# Patient Record
Sex: MALE | Race: WHITE | NOT HISPANIC OR LATINO | Employment: FULL TIME | URBAN - NONMETROPOLITAN AREA
[De-identification: names, ages, dates, MRNs, and addresses within clinical notes are randomized per-mention and may not be internally consistent; named-entity substitution may affect disease eponyms.]

---

## 2023-01-20 ENCOUNTER — OCCUPATIONAL MEDICINE (OUTPATIENT)
Dept: URGENT CARE | Facility: PHYSICIAN GROUP | Age: 63
End: 2023-01-20
Payer: COMMERCIAL

## 2023-01-20 VITALS
WEIGHT: 209 LBS | HEIGHT: 70 IN | RESPIRATION RATE: 14 BRPM | OXYGEN SATURATION: 98 % | BODY MASS INDEX: 29.92 KG/M2 | SYSTOLIC BLOOD PRESSURE: 138 MMHG | TEMPERATURE: 97.8 F | HEART RATE: 69 BPM | DIASTOLIC BLOOD PRESSURE: 80 MMHG

## 2023-01-20 DIAGNOSIS — M79.631 RIGHT FOREARM PAIN: ICD-10-CM

## 2023-01-20 DIAGNOSIS — M77.8 FOREARM TENDONITIS: ICD-10-CM

## 2023-01-20 PROCEDURE — 99203 OFFICE O/P NEW LOW 30 MIN: CPT | Performed by: NURSE PRACTITIONER

## 2023-01-20 RX ORDER — PREDNISONE 20 MG/1
20 TABLET ORAL 2 TIMES DAILY
Qty: 10 TABLET | Refills: 0 | Status: SHIPPED | OUTPATIENT
Start: 2023-01-20 | End: 2023-01-25

## 2023-01-20 ASSESSMENT — ENCOUNTER SYMPTOMS
FOCAL WEAKNESS: 0
FEVER: 0
CHILLS: 0
SENSORY CHANGE: 0
TINGLING: 0

## 2023-01-20 NOTE — LETTER
"EMPLOYEE’S CLAIM FOR COMPENSATION/ REPORT OF INITIAL TREATMENT  FORM C-4    EMPLOYEE’S CLAIM - PROVIDE ALL INFORMATION REQUESTED   First Name  Bebeto Last Name  Al Birthdate                    1960                Sex  male Claim Number (Insurer’s Use Only)    Home Address  [address] Age  62 y.o. Height  1.765 m (5' 9.5\") Weight  94.8 kg (209 lb) 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 (if applicable)  pulling wire stators    How did this injury or occupational disease occur? (Be specific an answer in detail. Use additional sheet if necessary)  Repetition. Constant repetition of motion in R forearms   If you believe that you have an occupational disease, when did you first have knowledge of the disability and it relationship to your employment?  NA Witnesses to the Accident  Coworkers      Nature of Injury or Occupational Disease  Strain  Part(s) of Body Injured or Affected  Lower Arm (R), ,     I certify that the above is true and correct to the best of my knowledge and that I have provided this information in order to obtain the benefits of [place] " [place]).  I hereby authorize any physician, chiropractor, surgeon, practitioner, or other person, any hospital, including [facility] hospital, any medical service organization, any insurance company, or other institution or organization to release to each other, any medical or other information, including benefits paid or payable, pertinent to this injury or disease, except information relative to diagnosis, treatment and/or counseling for AIDS, psychological conditions, alcohol or controlled substances, for which I must give specific authorization.  A Photostat of this authorization shall be as valid as the original.     Date 01/20/2023   [facility] Employee’s Original or  *Electronic Signature   THIS REPORT MUST BE COMPLETED AND MAILED WITHIN 3 WORKING DAYS OF TREATMENT   [facility] [place] [facility]  Name of Facility  [facility]   Date  1/20/2023 Diagnosis and Description of Injury or Occupational Disease  (M77.8) Forearm tendonitis  (M79.631) Right forearm pain Is there evidence the injured employee was under the influence of alcohol and/or another controlled substance at the time of accident?  ? No ? Yes (if yes, please explain)    Hour  11:05 AM   Diagnoses of Forearm tendonitis and Right forearm pain were pertinent to this visit. No   Treatment  Prednisone 20 mg BID x 5 days.  Continue diclofenac gel as previously prescribed.  Have you advised the patient to remain off work five days or     more?    X-Ray Findings      ? Yes Indicate dates:   From   To      From information given by the employee, together with medical evidence, can        you directly connect this injury or occupational disease as job incurred?  Yes  Comments:Likely due to repeat strain ? No If no, is the injured employee capable of:  ? full duty  Yes ? modified duty      Is additional medical care by a physician  "indicated?  Yes  Comments:Follow up in 1 month, sooner if worse If Modified Duty, Specify any Limitations / Restrictions      Do you know of any previous injury or disease contributing to this condition or occupational disease?  ? Yes ? No (Explain if yes)                          No   Date  1/20/2023 Print Health Care Provider's   PATRICE Mays I certify the employer’s copy of  this form was mailed on:   Address  [address] [place] [address] Insurer’s Use Only     [facility] Zip  [address]    Provider’s Tax ID Number  [id number] Telephone  Dept: [phone]             Health Care Provider’s Original or Electronic Signature  e-EMANI Yee Degree (MD,DO, DC,PAMaliaC,APRN)   APRN      * Complete and attach Release of Information (Form C-4A) when injured employee signs C-4 Form electronically  ORIGINAL - TREATING HEALTHCARE PROVIDER PAGE 2 - INSURER/TPA PAGE 3 - EMPLOYER PAGE 4 - EMPLOYEE             Form C-4 (rev.08/21)           BRIEF DESCRIPTION OF RIGHTS AND BENEFITS  (Pursuant to [place] 616C.050)    Notice of Injury or Occupational Disease (Incident Report Form C-1): If an injury or occupational disease (OD) arises out of and in the course of employment, you must provide written notice to your employer as soon as practicable, but no later than 7 days after the accident or OD. Your employer shall maintain a sufficient supply of the required forms.    Claim for Compensation (Form C-4): If medical treatment is sought, the form C-4 is available at the place of initial treatment. A completed \"Claim for Compensation\" (Form C-4) must be filed within 90 days after an accident or OD. The treating physician or chiropractor must, within 3 working days after treatment, complete and mail to the employer, the employer's insurer and third-party , the Claim for Compensation.    Medical Treatment: If you require medical treatment for your on-the-job injury or OD, you may be required to select " a physician or chiropractor from a list provided by your workers’ compensation insurer, if it has contracted with an Organization for Managed Care (MCO) or Preferred Provider Organization (PPO) or providers of health care. If your employer has not entered into a contract with an MCO or PPO, you may select a physician or chiropractor from the Panel of Physicians and Chiropractors. Any medical costs related to your industrial injury or OD will be paid by your insurer.    Temporary Total Disability (TTD): If your doctor has certified that you are unable to work for a period of at least 5 consecutive days, or 5 cumulative days in a 20-day period, or places restrictions on you that your employer does not accommodate, you may be entitled to TTD compensation.    Temporary Partial Disability (TPD): If the wage you receive upon reemployment is less than the compensation for TTD to which you are entitled, the insurer may be required to pay you TPD compensation to make up the difference. TPD can only be paid for a maximum of 24 months.    Permanent Partial Disability (PPD): When your medical condition is stable and there is an indication of a PPD as a result of your injury or OD, within 30 days, your insurer must arrange for an evaluation by a rating physician or chiropractor to determine the degree of your PPD. The amount of your PPD award depends on the date of injury, the results of the PPD evaluation, your age and wage.    Permanent Total Disability (PTD): If you are medically certified by a treating physician or chiropractor as permanently and totally disabled and have been granted a PTD status by your insurer, you are entitled to receive monthly benefits not to exceed 66 2/3% of your average monthly wage. The amount of your PTD payments is subject to reduction if you previously received a lump-sum PPD award.    Vocational Rehabilitation Services: You may be eligible for vocational rehabilitation services if you are  unable to return to the job due to a permanent physical impairment or permanent restrictions as a result of your injury or occupational disease.    Transportation and Per Jacoby Reimbursement: You may be eligible for travel expenses and per jacoby associated with medical treatment.    Reopening: You may be able to reopen your claim if your condition worsens after claim closure.     Appeal Process: If you disagree with a written determination issued by the insurer or the insurer does not respond to your request, you may appeal to the Department of Administration, , by following the instructions contained in your determination letter. You must appeal the determination within 70 days from the date of the determination letter at [address], [address], [place], [place] [address], or [address], [address], [place], [place] [address]. If you disagree with the  decision, you may appeal to the Department of Administration, . You must file your appeal within 30 days from the date of the  decision letter at [address], [address], [place], [place] [address], or [address] [facility] [address], [place] [address]. If you disagree with a decision of an , you may file a petition for judicial review with the District Court. You must do so within 30 days of the Appeal Officer’s decision. You may be represented by an  at your own expense or you may contact the [facility] for possible representation.    [place]  for Injured Workers (NAIW): If you disagree with a  decision, you may request that NAIW represent you without charge at an  Hearing. For information regarding denial of benefits, you may contact the [facility] at: [address], [address], [place] [address], [phone], or [address] [facility] [address], [place] [address], [phone]    To File a Complaint with the  Division: If you wish to file a complaint with the  of the Division of Industrial Relations (DIR),  please contact the Workers’ Compensation Section, [address] [facility] [place] [address], [place], [place] [address], telephone [phone], or [address], [address], [place], [place] [address], telephone [phone].    For assistance with Workers’ Compensation Issues: You may contact the [facility] Office for Consumer Health Assistance, [address], [address], [place], [place] [address], Toll Free 1-439.960.8791, Web site: http://[facility].[place].gov/Programs/PARTH E-mail: parth@[facility].nv.gov              __________________________________________________________________                                    __01/20/23___________            Employee Name / Signature                                                                                                                            Date                                                                                                                                                                                                                              D-2 (rev. 10/20)

## 2023-01-20 NOTE — PROGRESS NOTES
"Subjective     Bebeto Melendez is a 62 y.o. male who presents with Work-Related Injury (W/C new  right forearm - strain - ethel doi 04 08 2022)      Bebeto comes in today for evaluation regarding recurrent right forearm pain.  He first noted the pain at work on April 8, 2022.  He did not sustain any overt injury, but performs repeated grasping and pulling motions with lifting, pushing and pulling.  He was evaluated at on-site [facility] care and was advised of conservative measures to manage the symptoms.  He noted that the pain would flare during the work week and resolve on his days off or with rest.  Since then this pattern has persisted.  He continued to see [employer] with no resolution of symptoms.  He noted worsening of symptoms in November and increased his visits to [employer].  He also consulted his PCP and was prescribed diclofenac gel.  He sees a  and has massage.  He notes that the diclofenac, rest and massage all provide short term relief.  He denies any numbness, tingling or weakness. He is left hand dominant but uses his right hand in conjunction with the left to perform his usual work duties.  He feels capable of full duty work at this time.       HPI    Review of Systems   Constitutional:  Negative for chills, fever and malaise/fatigue.   Neurological:  Negative for tingling, sensory change and focal weakness.      Medications, Allergies, and current problem list reviewed today in Epic      Objective     Blood Pressure 138/80   Pulse 69   Temperature 36.6 °C (97.8 °F) (Temporal)   Respiration 14   Height 1.765 m (5' 9.5\")   Weight 94.8 kg (209 lb)   Oxygen Saturation 98%   Body Mass Index 30.42 kg/m²      Physical Exam    Bebeto is alert, oriented, and in no acute distress.  Vitals stable. Afebrile.  Right forearm and hand are warm, dry, and intact with no bruising, swelling, erythema, rash or lesion.  Elbow and wrist ROM intact.  Focal TTP of the forearm at the proximal radial " area at elbow attachment with slight muscle spasm.  Distal NV, sensation and strength intact.   intact but increases discomfort at proximal forearm.  Cap refill < 2 seconds.                   Assessment & Plan        1. Forearm tendonitis    - predniSONE (DELTASONE) 20 MG Tab; Take 1 Tablet by mouth 2 times a day for 5 days.  Dispense: 10 Tablet; Refill: 0    2. Right forearm pain    Discussed exam findings with Bebeto.  Differential reviewed.  Prednisone as prescribed.  Risks and benefits reviewed.  Continue diclofenac gel prn as previously prescribed.  No work restrictions.  He feels capable and comfortable with full duty work.   Likely work related strain.  Uncertain if claim will be approved due to length of time since onset of symptoms.    Follow up in 1 month, sooner if worse.  He verbalized understanding of and agreed with plan of care.

## 2023-01-20 NOTE — LETTER
[facility]  [address]  [place] [address]  Phone:  [phone] - Fax:  [phone]   [facility] Progress Report and Disability Certification  Date of Service: 1/20/2023   No Show:  No  Date / Time of Next Visit: 2/17/2023   Claim Information   Patient Name: Bebeto Melendez  Claim Number:     Employer: [employer]  Date of Injury: 11/7/2022     Insurer / TPA:   Mayela ID / SSN:     Occupation: Drive Unit  Diagnosis: Diagnoses of Forearm tendonitis and Right forearm pain were pertinent to this visit.    Medical Information   Related to Industrial Injury? Yes  Comments:Likely repeat strain.    Subjective Complaints:  Bebeto comes in today for evaluation regarding recurrent right forearm pain.  He first noted the pain at work on April 8, 2022.  He did not sustain any overt injury, but performs repeated grasping and pulling motions with lifting, pushing and pulling.  He was evaluated at on-site [employer] care and was advised of conservative measures to manage the symptoms.  He noted that the pain would flare during the work week and resolve on his days off or with rest.  Since then this pattern has persisted.  He continued to see [employer] with no resolution of symptoms.  He noted worsening of symptoms in November and increased his visits to [employer].  He also consulted his PCP and was prescribed diclofenac gel.  He sees a  and has massage.  He notes that the diclofenac, rest and massage all provide short term relief.  He denies any numbness, tingling or weakness. He is left hand dominant but uses his right hand in conjunction with the left to perform his usual work duties.  He feels capable of full duty work at this time.     Objective Findings: Bebeto is alert, oriented, and in no acute distress.  Vitals stable. Afebrile.  Right forearm and hand are warm, dry, and intact with no bruising, swelling, erythema, rash or lesion.  Elbow and wrist ROM  intact.  Focal TTP of the forearm at the proximal radial area at elbow attachment with slight muscle spasm.  Distal NV, sensation and strength intact.   intact but increases discomfort at proximal forearm.  Cap refill < 2 seconds.   Pre-Existing Condition(s):     Assessment:   Initial Visit    Status: Additional Care Required  Comments:Follow up in 1 month, sooner if worse.  Permanent Disability:No    Plan: Medication  Comments:Prednisone 20 mg BID x 5 days.  diclofenac gel as previously prescribed.    Diagnostics:      Comments:       Disability Information   Status: Released to Full Duty    From:  1/20/2023  Through: 2/17/2023 Restrictions are:     Physical Restrictions   Sitting:    Standing:    Stooping:    Bending:      Squatting:    Walking:    Climbing:    Pushing:      Pulling:    Other:    Reaching Above Shoulder (L):   Reaching Above Shoulder (R):       Reaching Below Shoulder (L):    Reaching Below Shoulder (R):      Not to exceed Weight Limits   Carrying(hrs):   Weight Limit(lb):   Lifting(hrs):   Weight  Limit(lb):     Comments:      Repetitive Actions   Hands: i.e. Fine Manipulations from Grasping:     Feet: i.e. Operating Foot Controls:     Driving / Operate Machinery:     Health Care Provider’s Original or Electronic Signature  PATRICE Mays Health Care Provider’s Original or Electronic Signature    Michael Rao DO MPH     Clinic Name / Location: [address] [address] Clinic Phone Number: Dept: [phone]   Appointment Time: 10:45 Am Visit Start Time: 11:05 AM   Check-In Time:  10:20 Am Visit Discharge Time:  11:46 AM   Original-Treating Physician or Chiropractor    Page 2-Insurer/TPA    Page 3-Employer    Page 4-Employee

## 2023-02-09 ENCOUNTER — OCCUPATIONAL MEDICINE (OUTPATIENT)
Dept: URGENT CARE | Facility: PHYSICIAN GROUP | Age: 63
End: 2023-02-09
Payer: COMMERCIAL

## 2023-02-09 VITALS
TEMPERATURE: 97.6 F | WEIGHT: 214 LBS | BODY MASS INDEX: 31.7 KG/M2 | OXYGEN SATURATION: 97 % | DIASTOLIC BLOOD PRESSURE: 80 MMHG | HEIGHT: 69 IN | HEART RATE: 61 BPM | RESPIRATION RATE: 14 BRPM | SYSTOLIC BLOOD PRESSURE: 138 MMHG

## 2023-02-09 DIAGNOSIS — M77.8 FOREARM TENDONITIS: ICD-10-CM

## 2023-02-09 DIAGNOSIS — M79.631 RIGHT FOREARM PAIN: ICD-10-CM

## 2023-02-09 PROCEDURE — 99214 OFFICE O/P EST MOD 30 MIN: CPT | Performed by: NURSE PRACTITIONER

## 2023-02-09 RX ORDER — MULTIVIT-MIN/IRON/FOLIC ACID/K 18-600-40
CAPSULE ORAL
COMMUNITY

## 2023-02-09 RX ORDER — SPIRONOLACTONE 25 MG/1
25 TABLET ORAL DAILY
COMMUNITY

## 2023-02-09 RX ORDER — SIMVASTATIN 10 MG
10 TABLET ORAL NIGHTLY
COMMUNITY

## 2023-02-09 RX ORDER — HYDROCHLOROTHIAZIDE 25 MG/1
25 TABLET ORAL DAILY
COMMUNITY

## 2023-02-09 RX ORDER — PREDNISONE 10 MG/1
20 TABLET ORAL 2 TIMES DAILY
Qty: 20 TABLET | Refills: 0 | Status: SHIPPED | OUTPATIENT
Start: 2023-02-09 | End: 2023-02-14

## 2023-02-09 RX ORDER — LOSARTAN POTASSIUM 50 MG/1
50 TABLET ORAL DAILY
COMMUNITY

## 2023-02-09 NOTE — LETTER
[facility]  [address]  [place] [address]  Phone:  [phone] - Fax:  [phone]   [facility] Progress Report and Disability Certification  Date of Service: 2/9/2023   No Show:  No  Date / Time of Next Visit: 2/23/2023   Claim Information   Patient Name: Bebeto Melendez  Claim Number:     Employer: [employer]  Date of Injury: 11/7/2022     Insurer / TPA: [employer]  ID / SSN:     Occupation: Drive Unit  Diagnosis: Diagnoses of Forearm tendonitis and Right forearm pain were pertinent to this visit.    Medical Information   Related to Industrial Injury?   Comments:Indeterminant    Subjective Complaints:  DOI: 4/8/2022   Bebeto comes in today for evaluation regarding reoccurring right lateral forearm pain.  He first noticed the pain work on April 8, 2022 and he continues to have intermittent pain in the same location off and on since.  He did not sustain any injury but indicates that repetitive grasping and pulling motion of wires and lifting 50 pounds.  He did contact work right care however was advised that they could not help him at this time due to open Worker's Comp. claim.  He notes that pain flares during the workweek and resolves with rest on his day off.  He does see a  and has massage several times a week which does help with his intermittent flares however missed last week.  He notes that diclofenac gel, rest, sauce, and strengthening exercises does provide relief.  He reports that prednisone has helped in the past.  He denies any numbness, tingling, or weakness in his right hand or arm.  He is left-hand dominant but does use his right hand in conjunction with his left while performing his usual work duties.  He does feel capable that he is able to perform all job tasks at this time and is able to work full duty.    Bebeto comes in today for evaluation regarding recurrent right forearm pain.  He first noted the pain at work on  April 8, 2022.  He did not sustain any overt injury, but performs repeated grasping and pulling motions with lifting, pushing and pulling.  He was evaluated at on-site [facility] care and was advised of conservative measures to manage the symptoms.  He noted that the pain would flare during the work week and resolve on his days off or with rest.  Since then this pattern has persisted.  He continued to see [employer] with no resolution of symptoms.  He noted worsening of symptoms in November and increased his visits to [employer].  He also consulted his PCP and was prescribed diclofenac gel.  He sees a  and has massage.  He notes that the diclofenac, rest and massage all provide short term relief.  He denies any numbness, tingling or weakness. He is left hand dominant but uses his right hand in conjunction with the left to perform his usual work duties.  He feels capable of full duty work at this time.    Objective Findings: He is alert and oriented and in no acute distress.  Vital signs within normal limits.  Right forearm and hand are warm, dry, and intact with no bruising, swelling, erythema, rashes, or lesions.  Elbow and wrist have normal ROM.  Normal sensation noted.  Focal TTP with deep palpation of lateral right forearm.   strength and tact and equal bilaterally.   Pre-Existing Condition(s):     Assessment:   Condition Worsened    Status: Additional Care Required  Permanent Disability:No    Plan: Medication  Comments:Prednisone 20 mg twice daily x5 days.  Diclofenac gel as previously prescribed.    Diagnostics:      Comments:       Disability Information   Status: Released to Full Duty    From:  2/9/2023  Through: 2/23/2023 Restrictions are:     Physical Restrictions   Sitting:    Standing:    Stooping:    Bending:      Squatting:    Walking:    Climbing:    Pushing:      Pulling:    Other:    Reaching Above Shoulder (L):   Reaching Above Shoulder (R):       Reaching Below Shoulder (L):     Reaching Below Shoulder (R):      Not to exceed Weight Limits   Carrying(hrs):   Weight Limit(lb):   Lifting(hrs):   Weight  Limit(lb):     Comments:      Repetitive Actions   Hands: i.e. Fine Manipulations from Grasping:     Feet: i.e. Operating Foot Controls:     Driving / Operate Machinery:     Health Care Provider’s Original or Electronic Signature  PATRICE Pereira Health Care Provider’s Original or Electronic Signature    Michael Rao DO MPH     Clinic Name / Location: [address] [address] Clinic Phone Number: Dept: [phone]   Appointment Time: 3:45 Pm Visit Start Time: 5:45 PM   Check-In Time:  3:29 Pm Visit Discharge Time:  6:45pm   Original-Treating Physician or Chiropractor    Page 2-Insurer/TPA    Page 3-Employer    Page 4-Employee

## 2023-02-09 NOTE — LETTER
"EMPLOYEE’S CLAIM FOR COMPENSATION/ REPORT OF INITIAL TREATMENT  FORM C-4    EMPLOYEE’S CLAIM - PROVIDE ALL INFORMATION REQUESTED   First Name  Bebeto Last Name  Al Birthdate                    1960                Sex  male Claim Number (Insurer’s Use Only)   Home Address  [address] Age  62 y.o. 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 (if applicable)  pulling wire stators    How did this injury or occupational disease occur? (Be specific an answer in detail. Use additional sheet if necessary)  Repetition. Constant repetition of motion in R forearms   If you believe that you have an occupational disease, when did you first have knowledge of the disability and it relationship to your employment?  NA Witnesses to the Accident  [employer]ow[employer]      Nature of Injury or Occupational Disease  Strain  Part(s) of Body Injured or Affected  Lower Arm (R), ,     I certify that the above is true and correct to the best of my knowledge and that I have provided this information in order to obtain the benefits of [place]’s " [place]).  I hereby authorize any physician, chiropractor, surgeon, practitioner, or other person, any hospital, including [facility] or [facility], any medical service organization, any insurance company, or other institution or organization to release to each other, any medical or other information, including benefits paid or payable, pertinent to this injury or disease, except information relative to diagnosis, treatment and/or counseling for AIDS, psychological conditions, alcohol or controlled substances, for which I must give specific authorization.  A Photostat of this authorization shall be as valid as the original.     Date   Place Employee’s Original or  *Electronic Signature   THIS REPORT MUST BE COMPLETED AND MAILED WITHIN 3 WORKING DAYS OF TREATMENT   Place  [facility]  Name of Facility  [place]   Date  2/9/2023 Diagnosis and Description of Injury or Occupational Disease  (M77.8) Forearm tendonitis  (M79.631) Right forearm pain Is there evidence the injured employee was under the influence of alcohol and/or another controlled substance at the time of accident?  ? No ? Yes (if yes, please explain)   Hour  5:45 PM   Diagnoses of Forearm tendonitis and Right forearm pain were pertinent to this visit.     Treatment     Have you advised the patient to remain off work five days or     more?    X-Ray Findings      ? Yes Indicate dates:   From   To      From information given by the employee, together with medical evidence, can        you directly connect this injury or occupational disease as job incurred?    ? No If no, is the injured employee capable of:  ? full duty    ? modified duty      Is additional medical care by a physician indicated?    If Modified Duty, Specify any Limitations / Restrictions      Do you know of any previous injury or disease contributing to this  "condition or occupational disease?  ? Yes ? No (Explain if yes)                              Date  2/9/2023 Print Health Care Provider's   PATRICE Pereira I certify the employer’s copy of  this form was mailed on:   Address  [address] Insurer’s Use Only     [facility] Zip  [address]    Provider’s Tax ID Number  [id number] Telephone  Dept: [phone]             Health Care Provider’s Original or Electronic Signature  e-SignSGARDENIA GOMEZ Degree (MD,DO, DC,PAMaliaC,APRN)  APRN      * Complete and attach Release of Information (Form C-4A) when injured employee signs C-4 Form electronically  ORIGINAL - TREATING HEALTHCARE PROVIDER PAGE 2 - INSURER/TPA PAGE 3 - EMPLOYER PAGE 4 - EMPLOYEE             Form C-4 (rev.08/21)           BRIEF DESCRIPTION OF RIGHTS AND BENEFITS  (Pursuant to [place]RS 616C.050)    Notice of Injury or Occupational Disease (Incident Report Form C-1): If an injury or occupational disease (OD) arises out of and in the course of employment, you must provide written notice to your employer as soon as practicable, but no later than 7 days after the accident or OD. Your employer shall maintain a sufficient supply of the required forms.    Claim for Compensation (Form C-4): If medical treatment is sought, the form C-4 is available at the place of initial treatment. A completed \"Claim for Compensation\" (Form C-4) must be filed within 90 days after an accident or OD. The treating physician or chiropractor must, within 3 working days after treatment, complete and mail to the employer, the employer's insurer and third-party , the Claim for Compensation.    Medical Treatment: If you require medical treatment for your on-the-job injury or OD, you may be required to select a physician or chiropractor from a list provided by your workers’ compensation insurer, if it has contracted with an Organization for Managed Care (MCO) or Preferred Provider Organization " (PPO) or providers of health care. If your employer has not entered into a contract with an MCO or PPO, you may select a physician or chiropractor from the Panel of Physicians and Chiropractors. Any medical costs related to your industrial injury or OD will be paid by your insurer.    Temporary Total Disability (TTD): If your doctor has certified that you are unable to work for a period of at least 5 consecutive days, or 5 cumulative days in a 20-day period, or places restrictions on you that your employer does not accommodate, you may be entitled to TTD compensation.    Temporary Partial Disability (TPD): If the wage you receive upon reemployment is less than the compensation for TTD to which you are entitled, the insurer may be required to pay you TPD compensation to make up the difference. TPD can only be paid for a maximum of 24 months.    Permanent Partial Disability (PPD): When your medical condition is stable and there is an indication of a PPD as a result of your injury or OD, within 30 days, your insurer must arrange for an evaluation by a rating physician or chiropractor to determine the degree of your PPD. The amount of your PPD award depends on the date of injury, the results of the PPD evaluation, your age and wage.    Permanent Total Disability (PTD): If you are medically certified by a treating physician or chiropractor as permanently and totally disabled and have been granted a PTD status by your insurer, you are entitled to receive monthly benefits not to exceed 66 2/3% of your average monthly wage. The amount of your PTD payments is subject to reduction if you previously received a lump-sum PPD award.    Vocational Rehabilitation Services: You may be eligible for vocational rehabilitation services if you are unable to return to the job due to a permanent physical impairment or permanent restrictions as a result of your injury or occupational disease.    Transportation and Per Negrita Reimbursement:  You may be eligible for travel expenses and per jacoby associated with medical treatment.    Reopening: You may be able to reopen your claim if your condition worsens after claim closure.     Appeal Process: If you disagree with a written determination issued by the insurer or the insurer does not respond to your request, you may appeal to the Department of Administration, , by following the instructions contained in your determination letter. You must appeal the determination within 70 days from the date of the determination letter at [address], [address], [place], [place] [address], or [address], [place], [place] [address]. If you disagree with the  decision, you may appeal to the Department of Administration, . You must file your appeal within 30 days from the date of the  decision letter at [address], [address], [place], [place] [address], or [address], [place], [place] [address]. If you disagree with a decision of an , you may file a petition for judicial review with the District Court. You must do so within 30 days of the Appeal Officer’s decision. You may be represented by an  at your own expense or you may contact the [facility] for possible representation.    [place]  for Injured Workers (NAIW): If you disagree with a  decision, you may request that NAIW represent you without charge at an  Hearing. For information regarding denial of benefits, you may contact the [facility] at: [address], [address], [place] [address], [phone], or [address], [place] [address], [phone]    To File a Complaint with the Division: If you wish to file a complaint with the  of the Division of Industrial Relations (DIR),  please contact the Workers’ Compensation Section, [address], [place], [place] [address], telephone (513) [phone], or [address], [place], [place] [address], telephone [phone].    For assistance with Workers’ Compensation Issues: You may contact the [facility] Office for Consumer Health Assistance, [address], [address], [place], [place] [address], Toll Free 1-365.370.2779, Web site: http://[facility].nv.gov/Programs/PARTH E-mail: parth@[facility].nv.gov              __________________________________________________________________                                    _________________            Employee Name / Signature                                                                                                                            Date                                                                                                                                                                                                                              D-2 (rev. 10/20)

## 2023-02-10 NOTE — PROGRESS NOTES
Subjective:     Bebeto Melendez is a 62 y.o. male who presents for Follow-Up (WC follow up, repetitive motion on R arm, was getting better but seems to have irritated it again and is having pain again. )      DOI: 4/8/2022   Bebeto comes in today for evaluation regarding reoccurring right lateral forearm pain.  He first noticed the pain work on April 8, 2022 and he continues to have intermittent pain in the same location off and on since.  He did not sustain any injury but indicates that repetitive grasping and pulling motion of wires and lifting 50 pounds.  He did contact work right care however was advised that they could not help him at this time due to open Worker's Comp. claim.  He notes that pain flares during the workweek and resolves with rest on his day off.  He does see a  and has massage several times a week which does help with his intermittent flares however missed last week.  He notes that diclofenac gel, rest, sauce, and strengthening exercises does provide relief.  He reports that prednisone has helped in the past.  He denies any numbness, tingling, or weakness in his right hand or arm.  He is left-hand dominant but does use his right hand in conjunction with his left while performing his usual work duties.  He does feel capable that he is able to perform all job tasks at this time and is able to work full duty.    Bebeto comes in today for evaluation regarding recurrent right forearm pain.  He first noted the pain at work on April 8, 2022.  He did not sustain any overt injury, but performs repeated grasping and pulling motions with lifting, pushing and pulling.  He was evaluated at on-site [facility] care and was advised of conservative measures to manage the symptoms.  He noted that the pain would flare during the work week and resolve on his days off or with rest.  Since then this pattern has persisted.  He continued to see [employer] with no resolution of symptoms.  He noted worsening  "of symptoms in November and increased his visits to [employer].  He also consulted his PCP and was prescribed diclofenac gel.  He sees a  and has massage.  He notes that the diclofenac, rest and massage all provide short term relief.  He denies any numbness, tingling or weakness. He is left hand dominant but uses his right hand in conjunction with the left to perform his usual work duties.  He feels capable of full duty work at this time.     PMH:   No pertinent past medical history to this problem  MEDS:  Medications were reviewed in EMR  ALLERGIES:  Allergies were reviewed in EMR  SOCHX:  Works at [employer]  FH:   No pertinent family history to this problem       Objective:     /80   Pulse 61   Temp 36.4 °C (97.6 °F) (Temporal)   Resp 14   Ht 1.753 m (5' 9\")   Wt 97.1 kg (214 lb)   SpO2 97%   BMI 31.60 kg/m²     He is alert and oriented and in no acute distress.  Vital signs within normal limits.  Right forearm and hand are warm, dry, and intact with no bruising, swelling, erythema, rashes, or lesions.  Elbow and wrist have normal ROM.  Normal sensation noted.  Focal TTP with deep palpation of lateral right forearm.   strength and tact and equal bilaterally.    Assessment/Plan:       1. Forearm tendonitis  - predniSONE (DELTASONE) 10 MG Tab; Take 2 Tablets by mouth 2 times a day for 5 days.  Dispense: 20 Tablet; Refill: 0    2. Right forearm pain    Other orders  - simvastatin (ZOCOR) 10 MG Tab; Take 10 mg by mouth every evening.  - hydroCHLOROthiazide (HYDRODIURIL) 25 MG Tab; Take 25 mg by mouth every day.  - spironolactone (ALDACTONE) 25 MG Tab; Take 25 mg by mouth every day.  - Cholecalciferol (VITAMIN D) 50 MCG (2000 UT) Cap; Take  by mouth.  - losartan (COZAAR) 50 MG Tab; Take 50 mg by mouth every day.    Released to Full Duty FROM 2/9/2023 TO 2/23/2023          Differential diagnosis, natural history, supportive care, and indications for immediate follow-up discussed.    "

## 2023-02-23 ENCOUNTER — OCCUPATIONAL MEDICINE (OUTPATIENT)
Dept: URGENT CARE | Facility: PHYSICIAN GROUP | Age: 63
End: 2023-02-23
Payer: COMMERCIAL

## 2023-02-23 VITALS
TEMPERATURE: 97.4 F | WEIGHT: 219 LBS | SYSTOLIC BLOOD PRESSURE: 122 MMHG | HEIGHT: 69 IN | BODY MASS INDEX: 32.44 KG/M2 | DIASTOLIC BLOOD PRESSURE: 78 MMHG | RESPIRATION RATE: 14 BRPM | HEART RATE: 71 BPM | OXYGEN SATURATION: 95 %

## 2023-02-23 DIAGNOSIS — M77.8 FOREARM TENDONITIS: ICD-10-CM

## 2023-02-23 DIAGNOSIS — M79.631 RIGHT FOREARM PAIN: ICD-10-CM

## 2023-02-23 PROCEDURE — 99214 OFFICE O/P EST MOD 30 MIN: CPT | Performed by: NURSE PRACTITIONER

## 2023-02-23 NOTE — LETTER
[facility]  [address]  [place] [address]  Phone:  [phone] - Fax:  [phone]   [facility] Progress Report and Disability Certification  Date of Service: 2/23/2023   No Show:  No  Date / Time of Next Visit: 3/17/2023   Claim Information   Patient Name: Bebeto Melendez  Claim Number:     Employer: [employer]  Date of Injury: 11/7/2022     Insurer / TPA: [employer]  ID / SSN:     Occupation: Drive Unit  Diagnosis: Diagnoses of Forearm tendonitis and Right forearm pain were pertinent to this visit.    Medical Information   Related to Industrial Injury? Yes    Subjective Complaints:  DOI: 4/8/2022  HARLEY: Repetition pulling of wires. Constant repetition of motion in R forearms.   2/23/2023: Bebeto comes into the clinic to for reevaluation of right forearm pain. He was last seen on 02/09/2023 and was placed on prednisone and diclofenac gel. He is also using OTC tylenol and continues to go to the gym and preform stretching exercises He state today that is pain in his right forearm dull achy pain on the lateral side of his arm near his elbow 4/10. 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 His pain is unchanged. Denies numbness, tingling, weakness, or decreased ROM.   Objective Findings: He is alert and oriented and in no acute distress.  Vital signs within normal limits.  Right forearm and hand are warm, dry, and intact with no bruising, swelling, erythema, rashes, or lesions.  Elbow and wrist have normal ROM.  Normal sensation noted.  Focal TTP with deep palpation of lateral right forearm.   strength and tact and equal bilaterally.   Pre-Existing Condition(s):     Assessment:   Condition Same    Status: Discharged / Care Transfer  Permanent Disability:No    Plan: MedicationTransfer Care  Comments:Developed neck gel, Tylenol, and  cryotherapy    Diagnostics:      Comments:       Disability Information   Status: Released to Restricted Duty    From:  2/23/2023  Through: 3/17/2023 Restrictions are: Temporary   Physical Restrictions   Sitting:    Standing:    Stooping:    Bending:      Squatting:    Walking:    Climbing:    Pushing:      Pulling:    Other:    Reaching Above Shoulder (L):   Reaching Above Shoulder (R):       Reaching Below Shoulder (L):    Reaching Below Shoulder (R):      Not to exceed Weight Limits   Carrying(hrs):   Weight Limit(lb):   Lifting(hrs):   Weight  Limit(lb):     Comments: Patient has been seen 3 times for Workmen's Comp. injury without any resolution of symptoms.  No pushing, pulling, lifting, or lateral movement of right arm.  Patient will continue with Diflucan neck gel, Tylenol 1000 mg up to 3 times daily, and cryotherapy to help with pain and inflammation.  Continue with gentle stretching exercises  Referral placed today for further evaluation and treatment by occupational therapy.    Repetitive Actions   Hands: i.e. Fine Manipulations from Grasping:     Feet: i.e. Operating Foot Controls:     Driving / Operate Machinery:     Health Care Provider’s Original or Electronic Signature  ESAU Pereira. Health Care Provider’s Original or Electronic Signature    Michael Rao DO MPH     Clinic Name / Location: [address] [address] Clinic Phone Number: Dept: [phone]   Appointment Time: 10:00 Am Visit Start Time: 10:27 AM   Check-In Time:  10:03 Am Visit Discharge Time:  11:02 am    Original-Treating Physician or Chiropractor    Page 2-Insurer/TPA    Page 3-Employer    Page 4-Employee

## 2023-02-23 NOTE — LETTER
[facility]  [address]  [place] [address]  Phone:  [phone] - Fax:  [phone]   [facility] Progress Report and Disability Certification  Date of Service: 2/23/2023   No Show:  No  Date / Time of Next Visit: 3/17/2023   Claim Information   Patient Name: Bebeto Melendez  Claim Number:     Employer: [employer] *** Date of Injury: 11/7/2022     Insurer / TPA: [employer] *** ID / SSN:     Occupation: Drive Unit *** Diagnosis: Diagnoses of Forearm tendonitis and Right forearm pain were pertinent to this visit.    Medical Information   Related to Industrial Injury? Yes ***   Subjective Complaints:  DOI: 4/8/2022  HARLEY: Repetition pulling of wires. Constant repetition of motion in R forearms.   2/23/2023: Bebeto comes into the clinic to for reevaluation of right forearm pain. He was last seen on 02/09/2023 and was placed on prednisone and diclofenac gel. He is also using OTC tylenol and continues to go to the gym and preform stretching exercises He state today that is pain in his right forearm dull achy pain on the lateral side of his arm near his elbow 4/10. 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 His pain is unchanged. Denies numbness, tingling, weakness, or decreased ROM.   Objective Findings: He is alert and oriented and in no acute distress.  Vital signs within normal limits.  Right forearm and hand are warm, dry, and intact with no bruising, swelling, erythema, rashes, or lesions.  Elbow and wrist have normal ROM.  Normal sensation noted.  Focal TTP with deep palpation of lateral right forearm.   strength and tact and equal bilaterally.   Pre-Existing Condition(s):     Assessment:   Condition Same    Status: Discharged / Care Transfer  Permanent Disability:No    Plan: MedicationTransfer Care  Comments:Developed neck gel,  Tylenol, and cryotherapy    Diagnostics:      Comments:       Disability Information   Status: Released to Restricted Duty    From:  2/23/2023  Through: 3/17/2023 Restrictions are: Temporary   Physical Restrictions   Sitting:    Standing:    Stooping:    Bending:      Squatting:    Walking:    Climbing:    Pushing:      Pulling:    Other:    Reaching Above Shoulder (L):   Reaching Above Shoulder (R):       Reaching Below Shoulder (L):    Reaching Below Shoulder (R):      Not to exceed Weight Limits   Carrying(hrs):   Weight Limit(lb):   Lifting(hrs):   Weight  Limit(lb):     Comments: Patient has been seen 3 times for Workmen's Comp. injury without any resolution of symptoms.  No pushing, pulling, lifting, or lateral movement of right arm.  Patient will continue with Diflucan neck gel, Tylenol 1000 mg up to 3 times daily, and cryotherapy to help with pain and inflammation.  Continue with gentle stretching exercises  Referral placed today for further evaluation and treatment by occupational therapy.    Repetitive Actions   Hands: i.e. Fine Manipulations from Grasping:     Feet: i.e. Operating Foot Controls:     Driving / Operate Machinery:     Health Care Provider’s Original or Electronic Signature  ESAU Pereira. Health Care Provider’s Original or Electronic Signature    Michael Rao DO MPH     Clinic Name / Location: [address] [address] Clinic Phone Number: Dept: [phone]   Appointment Time: 10:00 Am Visit Start Time: 10:27 AM   Check-In Time:  10:03 Am Visit Discharge Time:  ***   Original-Treating Physician or Chiropractor    Page 2-Insurer/TPA    Page 3-Employer    Page 4-Employee

## 2023-02-23 NOTE — PROGRESS NOTES
"Subjective:     Bebeto Melendez is a 62 y.o. male who presents for Follow-Up (WC follow up on R Arm, per pt it is about the same flares up while working, is currently lifting up to 50# )      DOI: 4/8/2022  HARLEY: Repetition pulling of wires. Constant repetition of motion in R forearms.   2/23/2023: Bebeto comes into the clinic to for reevaluation of right forearm pain. He was last seen on 02/09/2023 and was placed on prednisone and diclofenac gel. He is also using OTC tylenol and continues to go to the gym and preform stretching exercises He state today that is pain in his right forearm dull achy pain on the lateral side of his arm near his elbow 4/10. 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 His pain is unchanged. Denies numbness, tingling, weakness, or decreased ROM.    PMH:   No pertinent past medical history to this problem  MEDS:  Medications were reviewed in EMR  ALLERGIES:  Allergies were reviewed in EMR  SOCHX:  Works as a  Drive Unit for [employer]  FH:   No pertinent family history to this problem       Objective:     /78   Pulse 71   Temp 36.3 °C (97.4 °F) (Temporal)   Resp 14   Ht 1.753 m (5' 9\")   Wt 99.3 kg (219 lb)   SpO2 95%   BMI 32.34 kg/m²     He is alert and oriented and in no acute distress.  Vital signs within normal limits.  Right forearm and hand are warm, dry, and intact with no bruising, swelling, erythema, rashes, or lesions.  Elbow and wrist have normal ROM.  Normal sensation noted.  Focal TTP with deep palpation of lateral right forearm.   strength and tact and equal bilaterally.    Assessment/Plan:       1. Forearm tendonitis  - Referral to Occupational Therapy    2. Right forearm pain  - Referral to Occupational Therapy    Released to Restricted Duty FROM 2/23/2023 TO 3/17/2023  Patient has been seen 3 times for Workmen's Comp. injury without any resolution " of symptoms.  No pushing, pulling, lifting, or lateral movement of right arm.  Patient will continue with Diflucan neck gel, Tylenol 1000 mg up to 3 times daily, and cryotherapy to help with pain and inflammation.  Continue with gentle stretching exercises  Referral placed today for further evaluation and treatment by occupational therapy.       Differential diagnosis, natural history, supportive care, and indications for immediate follow-up discussed.

## 2023-03-17 ENCOUNTER — OCCUPATIONAL MEDICINE (OUTPATIENT)
Dept: URGENT CARE | Facility: PHYSICIAN GROUP | Age: 63
End: 2023-03-17
Payer: COMMERCIAL

## 2023-03-17 VITALS
HEART RATE: 80 BPM | WEIGHT: 220 LBS | BODY MASS INDEX: 32.58 KG/M2 | RESPIRATION RATE: 14 BRPM | DIASTOLIC BLOOD PRESSURE: 82 MMHG | HEIGHT: 69 IN | OXYGEN SATURATION: 97 % | TEMPERATURE: 97.6 F | SYSTOLIC BLOOD PRESSURE: 142 MMHG

## 2023-03-17 DIAGNOSIS — M77.8 FOREARM TENDONITIS: ICD-10-CM

## 2023-03-17 PROCEDURE — 99212 OFFICE O/P EST SF 10 MIN: CPT | Performed by: NURSE PRACTITIONER

## 2023-03-17 NOTE — PROGRESS NOTES
"Subjective:     Bebeto Melendez is a 62 y.o. male who presents for Follow-Up ( follow up strained arm due to repetition, called  OCC therapy but was never set up for appt,  is on light duty, pt is ready to go back to full duty, not having pain, )      DOI: 4/8/2022  HARLEY: Repetition pulling of wires. Constant repetition of motion in R forearms.   03/17/2023: Bebeto comes into the clinic to for reevaluation of right forearm pain. He was last seen on 02/23/2023. He states that his pain in his right forearm is now gone. He is taking OTC tylenol and diclofenac occasionally for mild pain and continues to go to the gym and preform stretching exercises.  He states he has been pain-free in his right forearm since February 25, 2023   Denies numbness, tingling, weakness, or decreased ROM.    PMH:   No pertinent past medical history to this problem  MEDS:  Medications were reviewed in EMR  ALLERGIES:  Allergies were reviewed in EMR  SOCHX:  Works as a Drive Unit at [employer]  FH:   No pertinent family history to this problem       Objective:     BP (!) 142/82   Pulse 80   Temp 36.4 °C (97.6 °F) (Temporal)   Resp 14   Ht 1.753 m (5' 9\")   Wt 99.8 kg (220 lb)   SpO2 97%   BMI 32.49 kg/m²     He is alert and oriented and in no acute distress.  Vital signs within normal limits.    Right forearm and hand are warm, dry, and intact with no bruising, swelling, erythema, rashes, or lesions.  Elbow and wrist have normal ROM.  Normal sensation noted.  Focal TTP with deep palpation of lateral right forearm.   strength and tact and equal bilaterally.    Assessment/Plan:       1. Forearm tendonitis    Released to Full Duty FROM   TO    Patient is back to [place].  He has full use of his right arm without pain.  No further work-up required at this time.       Differential diagnosis, natural history, supportive care, and indications for immediate follow-up discussed.    "

## 2023-03-17 NOTE — LETTER
[facility]  [address]  [place] [address]  Phone:  [phone] - Fax:  [phone]   [facility] Progress Report and Disability Certification  Date of Service: 3/17/2023   No Show:  No  Date / Time of Next Visit:     Claim Information   Patient Name: Bebeto Melendez  Claim Number:     Employer: [employer]  Date of Injury: 11/7/2022     Insurer / TPA: [employer]  ID / SSN:     Occupation: Drive Unit  Diagnosis: There were no encounter diagnoses.    Medical Information   Related to Industrial Injury?   Comments:Indeterminate    Subjective Complaints:  DOI: 4/8/2022  HARLEY: Repetition pulling of wires. Constant repetition of motion in R forearms.   03/17/2023: Bebeto comes into the clinic to for reevaluation of right forearm pain. He was last seen on 02/23/2023. He states that his pain in his right forearm is now gone. He is taking OTC tylenol and diclofenac occasionally for mild pain and continues to go to the gym and preform stretching exercises.  He states he has been pain-free in his right forearm since February 25, 2023   Denies numbness, tingling, weakness, or decreased ROM.   Objective Findings: He is alert and oriented and in no acute distress.  Vital signs within normal limits.    Right forearm and hand are warm, dry, and intact with no bruising, swelling, erythema, rashes, or lesions.  Elbow and wrist have normal ROM.  Normal sensation noted.  Focal TTP with deep palpation of lateral right forearm.   strength and tact and equal bilaterally.   Pre-Existing Condition(s):     Assessment:   Condition Improved    Status: Discharged /  MMI  Permanent Disability:No    Plan:      Diagnostics:      Comments:       Disability Information   Status: Released to Full Duty    From:     Through:   Restrictions are:     Physical Restrictions   Sitting:    Standing:    Stooping:    Bending:      Squatting:    Walking:    Climbing:    Pushing:      Pulling:     Other:    Reaching Above Shoulder (L):   Reaching Above Shoulder (R):       Reaching Below Shoulder (L):    Reaching Below Shoulder (R):      Not to exceed Weight Limits   Carrying(hrs):   Weight Limit(lb):   Lifting(hrs):   Weight  Limit(lb):     Comments: Patient is back to [facility].  He has full use of his right arm without pain.  No further work-up required at this time.    Repetitive Actions   Hands: i.e. Fine Manipulations from Grasping:     Feet: i.e. Operating Foot Controls:     Driving / Operate Machinery:     Health Care Provider’s Original or Electronic Signature  ESAU Pereira. Health Care Provider’s Original or Electronic Signature    Michael Rao DO MPH     Clinic Name / Location: [address] [address] Clinic Phone Number: Dept: [phone]   Appointment Time: 10:00 Am Visit Start Time: 10:14 AM   Check-In Time:  9:58 Am Visit Discharge Time:  11:09 AM   Original-Treating Physician or Chiropractor    Page 2-Insurer/TPA    Page 3-Employer    Page 4-Employee

## 2023-04-27 ENCOUNTER — OCCUPATIONAL MEDICINE (OUTPATIENT)
Dept: URGENT CARE | Facility: PHYSICIAN GROUP | Age: 63
End: 2023-04-27
Payer: COMMERCIAL

## 2023-04-27 VITALS
TEMPERATURE: 97.3 F | WEIGHT: 222 LBS | OXYGEN SATURATION: 97 % | RESPIRATION RATE: 14 BRPM | BODY MASS INDEX: 32.88 KG/M2 | HEART RATE: 69 BPM | SYSTOLIC BLOOD PRESSURE: 128 MMHG | DIASTOLIC BLOOD PRESSURE: 78 MMHG | HEIGHT: 69 IN

## 2023-04-27 DIAGNOSIS — M77.8 FOREARM TENDONITIS: ICD-10-CM

## 2023-04-27 PROCEDURE — 99213 OFFICE O/P EST LOW 20 MIN: CPT | Performed by: NURSE PRACTITIONER

## 2023-04-27 NOTE — PROGRESS NOTES
"Subjective:     Bebeto Melendez is a 62 y.o. 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 Constant repetition of motion in R forearms.    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  He denies any numbness or tingling in his hand or loss of hand or arm strength.  He does continue taking over-the-counter Tylenol and diclofenac gel as needed.  He does continue to go to the gym and do stretching exercises and massage.  He states today that he is here because [employer] ([employer] indicates that his x-rays  and he needs a new one as his claim continues to be open.    PMH:   No pertinent past medical history to this problem  MEDS:  Medications were reviewed in EMR  ALLERGIES:  Allergies were reviewed in EMR  SOCHX:  Works as a Drive Unit  FH:   No pertinent family history to this problem       Objective:     /78   Pulse 69   Temp 36.3 °C (97.3 °F) (Temporal)   Resp 14   Ht 1.753 m (5' 9\")   Wt 101 kg (222 lb)   SpO2 97%   BMI 32.78 kg/m²     He is alert and oriented and in no acute distress.  Vital signs within normal limits.    Right forearm and hand are warm, dry, and intact with no bruising, swelling, erythema, rashes, or lesions.  Elbow and wrist have normal ROM.  Normal sensation noted.  No TTP with deep palpation of lateral right forearm.   strength and tact and equal bilaterally.    Assessment/Plan:       1. Forearm tendonitis  - Referral to Occupational Medicine  - DX-FOREARM RIGHT; Future    Released to Restricted Duty FROM 2023 TO 2023  No lifting or moving greater than 30 pounds with right arm.  Referral placed to occupational medicine.  X-ray order placed.  May continue " with over-the-counter analgesics and diclofenac gel.  Continue with gentle stretching exercises and massage therapy.  Continue with cryo and heat therapy as needed       Differential diagnosis, natural history, supportive care, and indications for immediate follow-up discussed.

## 2023-04-27 NOTE — PATIENT INSTRUCTIONS
[facility], [facility]  [address]  [place] [address]  Phone: [phone]

## 2023-04-27 NOTE — LETTER
[facility]  [address]  [place] [address]  Phone:  [phone] - Fax:  [phone]   [facility] Progress Report and Disability Certification  Date of Service: 2023   No Show:  No  Date / Time of Next Visit: 2023   Claim Information   Patient Name: Bebeto Melendez  Claim Number:     Employer: [employer]  Date of Injury: 2022     Insurer / TPA: [employer]  ID / SSN:     Occupation: Drive Unit  Diagnosis: The encounter diagnosis was Forearm tendonitis.    Medical Information   Related to Industrial Injury? Yes    Subjective Complaints:  DOI: 2022  HARLEY: Repetition pulling of wires. Constant repetition of motion in R forearms.    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  He denies any numbness or tingling in his hand or loss of hand or arm strength.  He does continue taking over-the-counter Tylenol and diclofenac gel as needed.  He does continue to go to the gym and do stretching exercises and massage.  He states today that he is here because [employer] indicates that his x-rays  and he needs a new one as his claim continues to be open.   Objective Findings: He is alert and oriented and in no acute distress.  Vital signs within normal limits.    Right forearm and hand are warm, dry, and intact with no bruising, swelling, erythema, rashes, or lesions.  Elbow and wrist have normal ROM.  Normal sensation noted.  No TTP with deep palpation of lateral right forearm.   strength and tact and equal bilaterally.   Pre-Existing Condition(s):     Assessment:   Condition Worsened    Status: Additional Care Required  Permanent Disability:No    Plan: PTDiagnostics  Comments:[facility], forearm x-ray    Diagnostics:      Comments:       Disability Information   Status: Released to Restricted Duty    From:   4/27/2023  Through: 5/11/2023 Restrictions are: Temporary   Physical Restrictions   Sitting:    Standing:    Stooping:    Bending:      Squatting:    Walking:    Climbing:    Pushing:      Pulling:    Other:    Reaching Above Shoulder (L):   Reaching Above Shoulder (R):       Reaching Below Shoulder (L):    Reaching Below Shoulder (R):      Not to exceed Weight Limits   Carrying(hrs):   Weight Limit(lb):   Lifting(hrs):   Weight  Limit(lb):     Comments: No lifting or moving greater than 30 pounds with right arm.  Referral placed to occupational medicine.  X-ray order placed.  May continue with over-the-counter analgesics and diclofenac gel.  Continue with gentle stretching exercises and massage therapy.  Continue with cryo and heat therapy as needed    Repetitive Actions   Hands: i.e. Fine Manipulations from Grasping:     Feet: i.e. Operating Foot Controls:     Driving / Operate Machinery:     Health Care Provider’s Original or Electronic Signature  PATRICE Pereira Health Care Provider’s Original or Electronic Signature    Michael Rao DO MPH     Clinic Name / Location: [address] [address] Clinic Phone Number: Dept: [phone]   Appointment Time: 2:45 Pm Visit Start Time: 2:59 PM   Check-In Time:  2:56 Pm Visit Discharge Time:  3:42 PM   Original-Treating Physician or Chiropractor    Page 2-Insurer/TPA    Page 3-Employer    Page 4-Employee

## 2023-05-09 ENCOUNTER — APPOINTMENT (OUTPATIENT)
Dept: RADIOLOGY | Facility: IMAGING CENTER | Age: 63
End: 2023-05-09
Attending: NURSE PRACTITIONER

## 2023-05-09 ENCOUNTER — NON-PROVIDER VISIT (OUTPATIENT)
Dept: URGENT CARE | Facility: PHYSICIAN GROUP | Age: 63
End: 2023-05-09
Payer: COMMERCIAL

## 2023-05-09 ENCOUNTER — APPOINTMENT (OUTPATIENT)
Dept: RADIOLOGY | Facility: IMAGING CENTER | Age: 63
End: 2023-05-09
Attending: NURSE PRACTITIONER
Payer: COMMERCIAL

## 2023-05-09 DIAGNOSIS — M77.8 FOREARM TENDONITIS: ICD-10-CM

## 2023-05-09 PROCEDURE — 73090 X-RAY EXAM OF FOREARM: CPT | Mod: TC,FY,RT | Performed by: RADIOLOGY

## 2023-05-11 ENCOUNTER — OCCUPATIONAL MEDICINE (OUTPATIENT)
Dept: URGENT CARE | Facility: PHYSICIAN GROUP | Age: 63
End: 2023-05-11
Payer: COMMERCIAL

## 2023-05-11 VITALS
SYSTOLIC BLOOD PRESSURE: 132 MMHG | HEART RATE: 59 BPM | OXYGEN SATURATION: 97 % | BODY MASS INDEX: 32.29 KG/M2 | RESPIRATION RATE: 14 BRPM | DIASTOLIC BLOOD PRESSURE: 78 MMHG | TEMPERATURE: 97.1 F | HEIGHT: 69 IN | WEIGHT: 218 LBS

## 2023-05-11 DIAGNOSIS — M77.8 FOREARM TENDONITIS: ICD-10-CM

## 2023-05-11 PROCEDURE — 99213 OFFICE O/P EST LOW 20 MIN: CPT | Performed by: NURSE PRACTITIONER

## 2023-05-11 NOTE — PATIENT INSTRUCTIONS
[facility] DUNCAN Mu[address]oz  [address] [place] [address]  Phone: [phone]

## 2023-05-11 NOTE — LETTER
[facility]  [address]  [place] [address]  Phone:  [phone] - Fax:  [phone]   [facility] Progress Report and Disability Certification  Date of Service: 5/11/2023   No Show:  No  Date / Time of Next Visit: 5/25/2023   Claim Information   Patient Name: Bebeto Melendez  Claim Number:     Employer: [employer]  Date of Injury: 11/7/2022     Insurer / TPA: [employer]  ID / SSN:     Occupation: Drive Unit  Diagnosis: The encounter diagnosis was Forearm tendonitis.    Medical Information   Related to Industrial Injury? Yes    Subjective Complaints:  DOI: 4/8/2022  HARLEY: Repetition pulling of wires. Constant repetition of motion in R forearms.      Visit #5-patient today states he has no pain in his right forearm.  He states that his work moved him to a new area which does not have the same repetitive motion as his previous area of work pulling wires.  He denies any numbness or tingling in his hand or arm.  He does continue to use diclofenac gel, Tylenol, Motrin as needed.continues with massage therapy, stretching exercises, and strength training.   Objective Findings: He is alert and oriented and in no acute distress.  Vital signs within normal limits.    Right forearm and hand are warm, dry, and intact with no bruising, swelling, erythema, rashes, or lesions.  Elbow and wrist have normal ROM.  Normal sensation noted.  No TTP with deep palpation of lateral right forearm.   strength and tact and equal bilaterally.   Pre-Existing Condition(s):     Assessment:   Condition Improved    Status: Additional Care Required  Permanent Disability:No    Plan:      Diagnostics: X-ray    Comments:  Right forearm x-ray  5/9/2023 4:58 PM     HISTORY/REASON FOR EXAM:  Atraumatic Pain/Swelling/Deformity. Work-related repetitive motion     TECHNIQUE/EXAM DESCRIPTION AND NUMBER OF VIEWS:  2 views of the RIGHT forearm.     COMPARISON: None     FINDINGS:  No acute fracture or  subluxation is seen.     No joint effusion     No bony spurring     IMPRESSION:     No radiographic evidence of acute traumatic injury.    Disability Information   Status: Released to Restricted Duty    From:  5/11/2023  Through: 5/25/2023 Restrictions are: Temporary   Physical Restrictions   Sitting:    Standing:    Stooping:    Bending:      Squatting:    Walking:    Climbing:    Pushing:      Pulling:    Other:    Reaching Above Shoulder (L):   Reaching Above Shoulder (R):       Reaching Below Shoulder (L):    Reaching Below Shoulder (R):      Not to exceed Weight Limits   Carrying(hrs):   Weight Limit(lb):   Lifting(hrs):   Weight  Limit(lb):     Comments: No lifting or moving greater than 30 pounds with right arm.  Reviewed x-ray results with patient answered all questions.  Normal right forearm x-ray indicated.  Will call make appointment with occupational medicine.  Still waiting to schedule appointment with OT.  May continue with over-the-counter analgesics and diclofenac gel.  Continue with gentle stretching exercises and massage therapy.  Continue with cryo and heat therapy as needed    Repetitive Actions   Hands: i.e. Fine Manipulations from Grasping:     Feet: i.e. Operating Foot Controls:     Driving / Operate Machinery:     Health Care Provider’s Original or Electronic Signature  ESAU Pereira. Health Care Provider’s Original or Electronic Signature    Michael Rao DO MPH     [address]  [place] [address] Clinic Phone Number: Dept: [phone]   Appointment Time: 2:30 Pm Visit Start Time: 2:44 PM   Check-In Time:  2:40 Pm Visit Discharge Time:  3:15PM   Original-Treating Physician or Chiropractor    Page 2-Insurer/TPA    Page 3-Employer    Page 4-Employee

## 2023-05-11 NOTE — PROGRESS NOTES
"Subjective:     Bebeto Melendez is a 62 y.o. male who presents for Arm Injury ( follow up, needs new referral for [facility] Physical therapy, Fore arm feeling better )      DOI: 4/8/2022  HARLEY: Repetition pulling of wires. Constant repetition of motion in R forearms.      Visit #5-patient today states he has no pain in his right forearm.  He states that his work moved him to a new area which does not have the same repetitive motion as his previous area of work pulling wires.  He denies any numbness or tingling in his hand or arm.  He does continue to use diclofenac gel, Tylenol, Motrin as needed.continues with massage therapy, stretching exercises, and strength training.    PMH:   No pertinent past medical history to this problem  MEDS:  Medications were reviewed in EMR  ALLERGIES:  Allergies were reviewed in EMR  SOCHX:  Works as a Drive Unit  FH:   No pertinent family history to this problem       Objective:     /78   Pulse (!) 59   Temp 36.2 °C (97.1 °F) (Temporal)   Resp 14   Ht 1.753 m (5' 9\")   Wt 98.9 kg (218 lb)   SpO2 97%   BMI 32.19 kg/m²     He is alert and oriented and in no acute distress.  Vital signs within normal limits.    Right forearm and hand are warm, dry, and intact with no bruising, swelling, erythema, rashes, or lesions.  Elbow and wrist have normal ROM.  Normal sensation noted.  No TTP with deep palpation of lateral right forearm.   strength and tact and equal bilaterally.    Assessment/Plan:       1. Forearm tendonitis    Released to Restricted Duty FROM 5/11/2023 TO 5/25/2023  No lifting or moving greater than 30 pounds with right arm.  Reviewed x-ray results with patient answered all questions.  Normal right forearm x-ray indicated.  May continue with over-the-counter analgesics and diclofenac gel.  Continue with gentle stretching exercises and massage therapy.  Continue with cryo and heat therapy as needed  Right forearm x-ray  5/9/2023 4:58 PM     HISTORY/REASON FOR EXAM:  " Atraumatic Pain/Swelling/Deformity. Work-related repetitive motion     TECHNIQUE/EXAM DESCRIPTION AND NUMBER OF VIEWS:  2 views of the RIGHT forearm.     COMPARISON: None     FINDINGS:  No acute fracture or subluxation is seen.     No joint effusion     No bony spurring     IMPRESSION:     No radiographic evidence of acute traumatic injury.    Differential diagnosis, natural history, supportive care, and indications for immediate follow-up discussed.

## 2023-05-11 NOTE — LETTER
[facility]  [address]  [place] [address]  Phone:  [phone] - Fax:  [phone]   [facility] Progress Report and Disability Certification  Date of Service: 5/11/2023   No Show:  No  Date / Time of Next Visit: 5/25/2023   Claim Information   Patient Name: Bebeto Melendez  Claim Number:     Employer: [employer]  Date of Injury: 11/7/2022     Insurer / TPA: [employer]  ID / SSN:     Occupation: Drive Unit  Diagnosis: There were no encounter diagnoses.    Medical Information   Related to Industrial Injury? Yes    Subjective Complaints:  DOI: 4/8/2022  HARLEY: Repetition pulling of wires. Constant repetition of motion in R forearms.      Visit #5-patient today states he has no pain in his right forearm.  He states that his work moved him to a new area which does not have the same repetitive motion as his previous area of work pulling wires.  He denies any numbness or tingling in his hand or arm.  He does continue to use diclofenac gel, Tylenol, Motrin as needed.continues with massage therapy, stretching exercises, and strength training.   Objective Findings: He is alert and oriented and in no acute distress.  Vital signs within normal limits.    Right forearm and hand are warm, dry, and intact with no bruising, swelling, erythema, rashes, or lesions.  Elbow and wrist have normal ROM.  Normal sensation noted.  No TTP with deep palpation of lateral right forearm.   strength and tact and equal bilaterally.   Pre-Existing Condition(s):     Assessment:   Condition Improved    Status: Additional Care Required  Permanent Disability:No    Plan:      Diagnostics: X-ray    Comments:  Right forearm x-ray  5/9/2023 4:58 PM     HISTORY/REASON FOR EXAM:  Atraumatic Pain/Swelling/Deformity. Work-related repetitive motion     TECHNIQUE/EXAM DESCRIPTION AND NUMBER OF VIEWS:  2 views of the RIGHT forearm.     COMPARISON: None     FINDINGS:  No acute fracture or subluxation  is seen.     No joint effusion     No bony spurring     IMPRESSION:     No radiographic evidence of acute traumatic injury.    Disability Information   Status: Released to Restricted Duty    From:  5/11/2023  Through: 5/25/2023 Restrictions are: Temporary   Physical Restrictions   Sitting:    Standing:    Stooping:    Bending:      Squatting:    Walking:    Climbing:    Pushing:      Pulling:    Other:    Reaching Above Shoulder (L):   Reaching Above Shoulder (R):       Reaching Below Shoulder (L):    Reaching Below Shoulder (R):      Not to exceed Weight Limits   Carrying(hrs):   Weight Limit(lb):   Lifting(hrs):   Weight  Limit(lb):     Comments: No lifting or moving greater than 30 pounds with right arm.  Reviewed x-ray results with patient answered all questions.  Normal right forearm x-ray indicated.  May continue with over-the-counter analgesics and diclofenac gel.  Continue with gentle stretching exercises and massage therapy.  Continue with cryo and heat therapy as needed    Repetitive Actions   Hands: i.e. Fine Manipulations from Grasping:     Feet: i.e. Operating Foot Controls:     Driving / Operate Machinery:     Health Care Provider’s Original or Electronic Signature  ESAU Pereira. Health Care Provider’s Original or Electronic Signature    Michael Rao DO MPH     Clinic Name / Location: [address] [address] Clinic Phone Number: Dept: [phone]   Appointment Time: 2:30 Pm Visit Start Time: 2:44 PM   Check-In Time:  2:40 Pm Visit Discharge Time:  3:14 PM   Original-Treating Physician or Chiropractor    Page 2-Insurer/TPA    Page 3-Employer    Page 4-Employee